# Patient Record
Sex: MALE | Race: WHITE | Employment: FULL TIME | URBAN - METROPOLITAN AREA
[De-identification: names, ages, dates, MRNs, and addresses within clinical notes are randomized per-mention and may not be internally consistent; named-entity substitution may affect disease eponyms.]

---

## 2018-11-08 ENCOUNTER — INITIAL CONSULT (OUTPATIENT)
Dept: SURGERY | Age: 38
End: 2018-11-08
Payer: COMMERCIAL

## 2018-11-08 VITALS
SYSTOLIC BLOOD PRESSURE: 120 MMHG | BODY MASS INDEX: 25.06 KG/M2 | HEIGHT: 72 IN | DIASTOLIC BLOOD PRESSURE: 74 MMHG | WEIGHT: 185 LBS

## 2018-11-08 DIAGNOSIS — L72.3 SEBACEOUS CYST: Primary | ICD-10-CM

## 2018-11-08 PROCEDURE — 99241 PR OFFICE CONSULTATION NEW/ESTAB PATIENT 15 MIN: CPT | Performed by: SURGERY

## 2018-11-08 PROCEDURE — G8427 DOCREV CUR MEDS BY ELIG CLIN: HCPCS | Performed by: SURGERY

## 2018-11-08 PROCEDURE — G8419 CALC BMI OUT NRM PARAM NOF/U: HCPCS | Performed by: SURGERY

## 2018-11-08 PROCEDURE — G8484 FLU IMMUNIZE NO ADMIN: HCPCS | Performed by: SURGERY

## 2018-11-08 NOTE — PROGRESS NOTES
New Patient Via Abbe Swartz MD    [address] , [address] [place] [address]  [place], [facility]  [phone]    Yecenia Pineda   YOB: 1980    Date of Visit:  11/8/2018    Niurka Campoverde APRN - CNP    Chief Complaint: Axillary lump    HPI:  Patient resents for evaluation of a lump in his right armpit. He states he's had it for about 10 years. It hasn't really grown. It doesn't really bother him. It has never been infected or drained    No Known Allergies  No outpatient prescriptions have been marked as taking for the 11/8/18 encounter (Initial consult) with Cecil Magana MD.       History reviewed. No pertinent past medical history. History reviewed. No pertinent surgical history. History reviewed. No pertinent family history. Social History     Social History    Marital status:      Spouse name: N/A    Number of children: N/A    Years of education: N/A     Occupational History    Not on file. Social History Main Topics    Smoking status: Current Every Day Smoker    Smokeless tobacco: Former User    Alcohol use Yes    Drug use: No    Sexual activity: Yes     Other Topics Concern    Not on file     Social History Narrative    No narrative on file          Vitals:    11/08/18 1000   BP: 120/74   Weight: 185 lb (83.9 kg)   Height: 6' (1.829 m)     Body mass index is 25.09 kg/m².      Wt Readings from Last 3 Encounters:   11/08/18 185 lb (83.9 kg)     BP Readings from Last 3 Encounters:   11/08/18 120/74        ROS:  As per HPI, otherwise reviewed ×10 systems and negative    PE:  Constitutional:  Well developed, well nourished, no acute distress, non-toxic appearance   Eyes:  PERRL, conjunctiva normal   HENT:  Atraumatic, external ears normal, nose normal. Neck- normal range of motion, no tenderness, supple   Respiratory:  No respiratory distress, normal breath sounds, no rales, no wheezing

## 2019-05-01 ENCOUNTER — OFFICE VISIT (OUTPATIENT)
Dept: ORTHOPEDIC SURGERY | Age: 39
End: 2019-05-01

## 2019-05-01 VITALS
HEIGHT: 72 IN | BODY MASS INDEX: 25.05 KG/M2 | WEIGHT: 184.97 LBS | SYSTOLIC BLOOD PRESSURE: 130 MMHG | HEART RATE: 78 BPM | DIASTOLIC BLOOD PRESSURE: 80 MMHG

## 2019-05-01 DIAGNOSIS — S22.080A TRAUMATIC COMPRESSION FRACTURE OF T11 THORACIC VERTEBRA, CLOSED, INITIAL ENCOUNTER (HCC): Primary | ICD-10-CM

## 2019-05-01 PROCEDURE — 99203 OFFICE O/P NEW LOW 30 MIN: CPT | Performed by: PHYSICAL MEDICINE & REHABILITATION

## 2019-05-01 RX ORDER — NAPROXEN 500 MG/1
500 TABLET ORAL 2 TIMES DAILY WITH MEALS
COMMUNITY

## 2019-05-01 NOTE — PROGRESS NOTES
New Patient: SPINE    CHIEF COMPLAINT:    Chief Complaint   Patient presents with    Back Pain     NP thoracic pain, fell out of a barn on Monday went to [facility] ER       HISTORY OF PRESENT ILLNESS:                The patient is a 44 y.o. male referred through [facility]     The patient fell out of a bar and 2 days ago. He landed on his feet but then his back on a piece of wood. He had bruising on his back and immediate pain. It seated [facility] where CT scan showed a T11 fracture. Reports pain but mild he has no radiating leg pain no numbness tingling or weakness. He feels better every day not worse. He reports no prior spine trauma        Pain Assessment  Location of Pain: Back  Severity of Pain: 2  Quality of Pain: Aching  Duration of Pain: Persistent  Frequency of Pain: Constant  Aggravating Factors: Standing, Walking, Other (Comment)  Limiting Behavior: Yes  Relieving Factors: Rest  Result of Injury: Yes  Work-Related Injury: No  Are there other pain locations you wish to document?: No    The pain assessment was noted & reviewed in the medical record today. 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 Past Surgical History:     No past surgical history on file. Current Medications:     Current Outpatient Medications:     naproxen (NAPROSYN) 500 MG tablet, Take 500 mg by mouth 2 times daily (with meals), Disp: , Rfl:   Allergies:  Patient has no known allergies. Social History:    reports that he has been smoking.   He has quit using smokeless tobacco. He reports that he drinks alcohol. He reports that he does not use drugs. Family History:   No family history on file. REVIEW OF SYSTEMS: Full ROS noted & scanned   CONSTITUTIONAL: Denies unexplained weight loss, fevers, chills or fatigue  NEUROLOGICAL: Denies unsteady gait or progressive weakness  MUSCULOSKELETAL: Denies joint swelling or redness  PSYCHOLOGICAL: Denies anxiety, depression   SKIN: Denies skin changes, delayed healing, rash, itching   HEMATOLOGIC: Denies easy bleeding or bruising  ENDOCRINE: Denies excessive thirst, urination, heat/cold  RESPIRATORY: Denies current dyspnea, cough  GI: Denies nausea, vomiting, diarrhea   : Denies bowel or bladder issues       PHYSICAL EXAM:    Vitals: Blood pressure 130/80, pulse 78, height 6' 0.01\" (1.829 m), weight 184 lb 15.5 oz (83.9 kg). GENERAL EXAM:  · General Apparence: Patient is adequately groomed with no evidence of malnutrition. · Orientation: The patient is oriented to time, place and person. · Mood & Affect:The patient's mood and affect are appropriate   · Vascular: Examination reveals no swelling tenderness in upper or lower extremities. Good capillary refill  · Lymphatic: The lymphatic examination bilaterally reveals all areas to be without enlargement or induration  · Sensation: Sensation is intact without deficit  · Coordination/Balance: Good coordination     LUMBAR/SACRAL EXAMINATION:  · Inspection: Local inspection shows no step-off or bruising. Lumbar alignment is normal.  Sagittal and Coronal balance is neutral.      · Palpation:  midline percussive tenderness around L3 and also higher at T11 or T12   · Range of Motionmild to moderate loss or range of motion due to painength:   Strength testing is 5/5 in all muscle groups tested. · Special Tests:   Straight leg raise and crossed SLR negative. Leg length and pelvis level.  0 out of 5 Perez's signs.         · Skin: There are no rashes, ulcerations or lesions. · Reflexes: Reflexes are symmetrically 2+ at the patellar and ankle tendons. Clonus absent bilaterally at the feet. · Gait & stnormal gaitAdditional Examinations:   · RIGHT LOWER EXTREMITY: Inspection/examination of the right lower extremity does not show any tenderness, deformity or injury. Range of motion is full. There is no gross instability. There are no rashes, ulcerations or lesions. Strength and tone are normal.  ·   · LEFT LOWER EXTREMITY:  Inspection/examination of the left lower extremity does not show any tenderness, deformity or injury. Range of motion is full. There is no gross instability. There are no rashes, ulcerations or lesions.   Strength and tone are normal.    Diagnostic Testing:       CT scan films report from [facility] 4/29/2019 show T 11 superior endplate fracture with 42% loss of height    Impression:     acute T11 VCF         Plan:      TLSO    BLT restrictions, 10 pound lifting restrictions  Four-week follow-up with x-rays    ANTONIO Machuca

## 2019-05-08 ENCOUNTER — TELEPHONE (OUTPATIENT)
Dept: ORTHOPEDIC SURGERY | Age: 39
End: 2019-05-08

## 2019-05-08 NOTE — TELEPHONE ENCOUNTER
5/8/19  DME  - NO COVERAGE FOUND - CHECKED  [facility] FOR INSURANCE INFO ON  5/1, 5/2 AND 5/8/19 - NO INSURANCE COVERAGE REGISTERED. UNABLE TO OBTAIN [employer] UNTIL [id number] INSURANCE IS REGISTERED. [employer] HAS DELETED THE ORDER.   NDS

## 2019-05-29 ENCOUNTER — OFFICE VISIT (OUTPATIENT)
Dept: ORTHOPEDIC SURGERY | Age: 39
End: 2019-05-29

## 2019-05-29 VITALS
DIASTOLIC BLOOD PRESSURE: 67 MMHG | HEART RATE: 61 BPM | WEIGHT: 184.97 LBS | SYSTOLIC BLOOD PRESSURE: 114 MMHG | BODY MASS INDEX: 25.05 KG/M2 | HEIGHT: 72 IN

## 2019-05-29 DIAGNOSIS — S22.080A TRAUMATIC COMPRESSION FRACTURE OF T11 THORACIC VERTEBRA, CLOSED, INITIAL ENCOUNTER (HCC): Primary | ICD-10-CM

## 2019-05-29 PROCEDURE — 99213 OFFICE O/P EST LOW 20 MIN: CPT | Performed by: PHYSICAL MEDICINE & REHABILITATION

## 2019-05-29 NOTE — PROGRESS NOTES
New Patient: SPINE    CHIEF COMPLAINT:    Chief Complaint   Patient presents with    Back Pain     fu back       HISTORY OF PRESENT ILLNESS:                The patient is a 44 y.o. male four-week follow-up T11 compression fracture. He has been wearing his brace for 4 weeks. His pain is significantly improved. He has been compliant. No radiating leg pain    The pain assessment was noted & reviewed in the medical record today. 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 Past Surgical History:     No past surgical history on file. Current Medications:     Current Outpatient Medications:     naproxen (NAPROSYN) 500 MG tablet, Take 500 mg by mouth 2 times daily (with meals), Disp: , Rfl:   Allergies:  Patient has no known allergies. Social History:    reports that he has been smoking. He has quit using smokeless tobacco. He reports that he drinks alcohol. He reports that he does not use drugs. Family History:   No family history on file.     REVIEW OF SYSTEMS: Full ROS noted & scanned   CONSTITUTIONAL: Denies unexplained weight loss, fevers, chills or fatigue  NEUROLOGICAL: Denies unsteady gait or progressive weakness  MUSCULOSKELETAL: Denies joint swelling or redness  PSYCHOLOGICAL: Denies anxiety, depression   SKIN: Denies skin changes, delayed healing, rash, itching   HEMATOLOGIC: Denies easy bleeding or bruising  ENDOCRINE: Denies excessive thirst, urination, heat/cold  RESPIRATORY: Denies current dyspnea, cough  GI: Denies nausea, vomiting, diarrhea   : Denies bowel or bladder issues PHYSICAL EXAM:    Vitals: Blood pressure 114/67, pulse 61, height 6' 0.01\" (1.829 m), weight 184 lb 15.5 oz (83.9 kg). GENERAL EXAM:  · General Apparence: Patient is adequately groomed with no evidence of malnutrition. · Orientation: The patient is oriented to time, place and person. · Mood & Affect:The patient's mood and affect are appropriate   · Vascular: Examination reveals no swelling tenderness in upper or lower extremities. Good capillary refill  · Lymphatic: The lymphatic examination bilaterally reveals all areas to be without enlargement or induration  · Sensation: Sensation is intact without deficit  · Coordination/Balance: Good coordination     LUMBAR/SACRAL EXAMINATION:  · Inspection: Local inspection shows no step-off or bruising. Lumbar alignment is normal.  Sagittal and Coronal balance is neutral.      · Palpation:  midline percussive tenderness around L3 and also higher at T11 or T12   · Range of Motionmild to moderate loss or range of motion due to painength:   Strength testing is 5/5 in all muscle groups tested. · Special Tests:   Straight leg raise and crossed SLR negative. Leg length and pelvis level.  0 out of 5 Perez's signs. · Skin: There are no rashes, ulcerations or lesions. · Reflexes: Reflexes are symmetrically 2+ at the patellar and ankle tendons. Clonus absent bilaterally at the feet. · Gait & stnormal gaitAdditional Examinations:   · RIGHT LOWER EXTREMITY: Inspection/examination of the right lower extremity does not show any tenderness, deformity or injury. Range of motion is full. There is no gross instability. There are no rashes, ulcerations or lesions. Strength and tone are normal.  ·   · LEFT LOWER EXTREMITY:  Inspection/examination of the left lower extremity does not show any tenderness, deformity or injury. Range of motion is full. There is no gross instability. There are no rashes, ulcerations or lesions.   Strength and tone are normal.    Diagnostic Testing:      X-rays 2 views lumbar spine 5/29/2019 show T11 endplate fracture with 43-85% loss of height     CT scan films report from [facility] 4/29/2019 show T 11 superior endplate fracture with 35% loss of height    Impression:     acute T11 VCF -stable status post 4 weeks bracing        Plan:      TLSO ×4 weeks   BLT restrictions, 10 pound lifting restrictions  Four-week follow-up    ANTONIO Haro

## 2020-09-22 ENCOUNTER — OFFICE VISIT (OUTPATIENT)
Dept: SURGERY | Age: 40
End: 2020-09-22

## 2020-09-22 VITALS
BODY MASS INDEX: 24.24 KG/M2 | SYSTOLIC BLOOD PRESSURE: 120 MMHG | DIASTOLIC BLOOD PRESSURE: 74 MMHG | WEIGHT: 179 LBS | TEMPERATURE: 98.6 F | HEIGHT: 72 IN

## 2020-09-22 PROCEDURE — 99024 POSTOP FOLLOW-UP VISIT: CPT | Performed by: SURGERY

## 2020-09-22 PROCEDURE — 11403 EXC TR-EXT B9+MARG 2.1-3CM: CPT | Performed by: SURGERY

## 2020-09-22 NOTE — PROGRESS NOTES
[facility] SURGERY    CHIEF COMPLAINT: Right armpit mass    SUBJECTIVE:   Patient presents for follow up of his sebaceous cyst.  He reports it is getting larger. No Known Allergies  No outpatient medications have been marked as taking for the 9/22/20 encounter (Office Visit) with Wally Vazquez MD.       History reviewed. No pertinent past medical history. History reviewed. No pertinent surgical history. History reviewed. No pertinent family history. Social History     Socioeconomic History    Marital status:      Spouse name: Not on file    Number of children: Not on file    Years of education: Not on file    Highest education level: Not on file   Occupational History    Not on file   Social Needs    Financial resource strain: Not on file    Food insecurity     Worry: Not on file     Inability: Not on file    Transportation needs     Medical: Not on file     Non-medical: Not on file   Tobacco Use    Smoking status: Current Every Day Smoker    Smokeless tobacco: Former User   Substance and Sexual Activity    Alcohol use:  Yes    Drug use: No    Sexual activity: Yes   Lifestyle    Physical activity     Days per week: Not on file     Minutes per session: Not on file    Stress: Not on file   Relationships    Social connections     Talks on phone: Not on file     Gets together: Not on file     Attends [employer] service: Not on file     Active member of club or organization: Not on file     Attends meetings of clubs or organizations: Not on file     Relationship status: Not on file    Intimate partner violence     Fear of current or ex partner: Not on file     Emotionally abused: Not on file     Physically abused: Not on file     Forced sexual activity: Not on file   Other Topics Concern    Not on file   Social History Narrative    Not on file          Vitals:    09/22/20 0914   BP: 120/74   Site: Left Upper Arm   Position: Sitting   Cuff Size: Large Adult   Temp: 98.6 °F (37 °C)   TempSrc: Temporal   Weight: 179 lb (81.2 kg)   Height: 6' (1.829 m)     Body mass index is 24.28 kg/m². ROS:  As per HPI, otherwise reviewed and negative    PHYSICAL EXAM:     Constitutional:  Well developed, well nourished, no acute distress, non-toxic appearance   Respiratory:  No respiratory distress, normal breath sounds, no rales, no wheezing   Cardiovascular:  Normal rate, normal rhythm, no murmurs. Integument: Right axilla with a 2.4 cm sebaceous cyst  Neurologic:  Alert & oriented x 3, normal motor function, normal sensory function, no focal deficits noted   Psychiatric:  Speech and behavior appropriate             DATA:  N/A    ASSESSMENT:   1. Sebaceous cyst           PLAN: Excision of sebaceous cyst. I explained the procedure including risks, benefits, and alternatives. Questions were answered and the patient agrees to proceed. PROCEDURE: After obtaining verbal consent, the patient's right axilla was sterilely prepped and anesthetized with combination 1% lidocaine and 0.5% Marcaine. The 2.4 cm cyst was excised via an 8 x 20 mm ellipse of skin. Bleeding was controlled with pressure. The incision was closed with a running subcuticular suture of 4-0 Vicryl followed by benzoin, Steri-Strips, and dry sterile dressings. The patient tolerated the procedure well and was sent home in good condition.